# Patient Record
Sex: FEMALE | Race: WHITE | URBAN - METROPOLITAN AREA
[De-identification: names, ages, dates, MRNs, and addresses within clinical notes are randomized per-mention and may not be internally consistent; named-entity substitution may affect disease eponyms.]

---

## 2018-06-14 ENCOUNTER — OFFICE VISIT (OUTPATIENT)
Dept: PRIMARY CARE CLINIC | Age: 28
End: 2018-06-14
Payer: COMMERCIAL

## 2018-06-14 VITALS — WEIGHT: 153.7 LBS | HEIGHT: 66 IN | TEMPERATURE: 97.9 F | BODY MASS INDEX: 24.7 KG/M2

## 2018-06-14 DIAGNOSIS — J30.1 ACUTE SEASONAL ALLERGIC RHINITIS DUE TO POLLEN: ICD-10-CM

## 2018-06-14 DIAGNOSIS — Z00.00 ANNUAL PHYSICAL EXAM: Primary | ICD-10-CM

## 2018-06-14 PROCEDURE — 1036F TOBACCO NON-USER: CPT | Performed by: FAMILY MEDICINE

## 2018-06-14 PROCEDURE — 99203 OFFICE O/P NEW LOW 30 MIN: CPT | Performed by: FAMILY MEDICINE

## 2018-06-14 PROCEDURE — G8419 CALC BMI OUT NRM PARAM NOF/U: HCPCS | Performed by: FAMILY MEDICINE

## 2018-06-14 PROCEDURE — G8427 DOCREV CUR MEDS BY ELIG CLIN: HCPCS | Performed by: FAMILY MEDICINE

## 2018-06-14 ASSESSMENT — ENCOUNTER SYMPTOMS
EYES NEGATIVE: 1
EYE REDNESS: 0
SHORTNESS OF BREATH: 0
EYE ITCHING: 0
WHEEZING: 0
GASTROINTESTINAL NEGATIVE: 1
ABDOMINAL PAIN: 0
CONSTIPATION: 0
DIARRHEA: 0
RESPIRATORY NEGATIVE: 1
BACK PAIN: 0
PHOTOPHOBIA: 0

## 2018-06-14 ASSESSMENT — PATIENT HEALTH QUESTIONNAIRE - PHQ9
2. FEELING DOWN, DEPRESSED OR HOPELESS: 0
SUM OF ALL RESPONSES TO PHQ9 QUESTIONS 1 & 2: 0
SUM OF ALL RESPONSES TO PHQ QUESTIONS 1-9: 0
1. LITTLE INTEREST OR PLEASURE IN DOING THINGS: 0

## 2024-07-08 RX ORDER — ONDANSETRON 4 MG/1
TABLET, FILM COATED ORAL
Qty: 30 TABLET | Refills: 1 | Status: SHIPPED | OUTPATIENT
Start: 2024-07-08

## 2025-06-25 ENCOUNTER — OFFICE VISIT (OUTPATIENT)
Dept: PRIMARY CARE | Facility: CLINIC | Age: 35
End: 2025-06-25
Payer: COMMERCIAL

## 2025-06-25 VITALS
OXYGEN SATURATION: 99 % | WEIGHT: 137 LBS | DIASTOLIC BLOOD PRESSURE: 58 MMHG | SYSTOLIC BLOOD PRESSURE: 94 MMHG | TEMPERATURE: 98.7 F | HEIGHT: 65 IN | HEART RATE: 97 BPM | BODY MASS INDEX: 22.82 KG/M2

## 2025-06-25 DIAGNOSIS — H01.114 ALLERGIC DERMATITIS OF UPPER AND LOWER EYELID OF LEFT EYE: ICD-10-CM

## 2025-06-25 DIAGNOSIS — L29.9 ITCHY SKIN: Primary | ICD-10-CM

## 2025-06-25 DIAGNOSIS — H01.115 ALLERGIC DERMATITIS OF UPPER AND LOWER EYELID OF LEFT EYE: ICD-10-CM

## 2025-06-25 DIAGNOSIS — H02.846 SWELLING OF LEFT EYELID, UNSPECIFIED EYELID: ICD-10-CM

## 2025-06-25 PROCEDURE — 99203 OFFICE O/P NEW LOW 30 MIN: CPT | Performed by: NURSE PRACTITIONER

## 2025-06-25 RX ORDER — TRIAMCINOLONE ACETONIDE 5 MG/G
OINTMENT TOPICAL 2 TIMES DAILY
Qty: 15 G | Refills: 1 | Status: SHIPPED | OUTPATIENT
Start: 2025-06-25 | End: 2025-07-10

## 2025-06-25 RX ORDER — BISMUTH SUBSALICYLATE 262 MG
1 TABLET,CHEWABLE ORAL DAILY
COMMUNITY

## 2025-06-25 RX ORDER — PREDNISONE 10 MG/1
TABLET ORAL
Qty: 30 TABLET | Refills: 0 | Status: SHIPPED | OUTPATIENT
Start: 2025-06-25 | End: 2025-07-05

## 2025-06-25 ASSESSMENT — PATIENT HEALTH QUESTIONNAIRE - PHQ9
1. LITTLE INTEREST OR PLEASURE IN DOING THINGS: NOT AT ALL
1. LITTLE INTEREST OR PLEASURE IN DOING THINGS: NOT AT ALL
2. FEELING DOWN, DEPRESSED OR HOPELESS: NOT AT ALL
SUM OF ALL RESPONSES TO PHQ9 QUESTIONS 1 AND 2: 0
2. FEELING DOWN, DEPRESSED OR HOPELESS: NOT AT ALL
SUM OF ALL RESPONSES TO PHQ9 QUESTIONS 1 AND 2: 0

## 2025-06-25 ASSESSMENT — PAIN SCALES - GENERAL: PAINLEVEL_OUTOF10: 2

## 2025-06-25 ASSESSMENT — ENCOUNTER SYMPTOMS
LOSS OF SENSATION IN FEET: 0
DEPRESSION: 0
OCCASIONAL FEELINGS OF UNSTEADINESS: 0

## 2025-06-25 NOTE — PATIENT INSTRUCTIONS
DISCHARGE SUMMARY:   Patient was seen and examined. Diagnosis, treatment, treatment options, and possible complications of today's illness discussed and explained to patient. Patient to take medication/s associated with this visit. Patient may also take OTC analgesic/antipyretic if needed for pain/fever. Advised eye protection, hand hygiene/washing, personal hygiene, and refrain from using contact lenses, eye shadows/liners, nor mascara. Advised avoidance of known or suspected allergen. Advised hypoallergenic diet. Advised to come back if with worsening or persistent symptoms. Advised to come back if there is wheezing, change in voice quality, shortness of breath or chest pain.  Advised to come back if with worsening or persistent symptoms. Patient verbalized understanding of plan of care.    Patient to come back in 7 - 10 days if needed for worsening symptoms.

## 2025-06-25 NOTE — PROGRESS NOTES
Subjective   Patient ID: Nini Grijalva is a 34 y.o. female who is with chief complaint of swelling and itching around the left eye.     HPI   Patient is a 34 y.o. female who CONSULTED AT [facility] today. Patient is with complaints of swelling and itching around the left eye. she states that the symptoms started about 2 days ago. She woke up with itching around the left eye including upper and lower eyelids. She also noted swelling and some redness on the swollen itchy skin around the eye. She denies having any trauma/injury to the eye, use of contact lens, nor any exposure to people with same symptoms. she states there were no eye pain nor photophobia. she denies any blurring of vision, visual floaters, double vision, nor change in visual acuity. she denies fever, nausea, vomiting, headache, nor any nasal congestion nor discharge. She has mild headache and pain on the left frontal area. She also noticed today an itchy red rash on her right forearm. She states she is very allergic to poison ivy and was not sure if she had been exposed. She took benadryl last night and used OTC hydrocortisone cream.     Review of Systems  General: no weight loss, generally healthy, no fatigue  Head: (+) left sided frontal headache, no sinus pain, no vertigo, no injury  Eyes: (+) swelling and itching around the left eye, no diplopia, no tearing, no pain,   Ears: no change in hearing, no tinnitus, no bleeding, no vertigo  Mouth:  no dental difficulties, no gingival bleeding, no sore throat, no loss of sense of taste  Nose: no congestion, no  discharge, no bleeding, no obstruction, no loss of sense of smell  Neck: no stiffness, no pain, no tenderness, no masses, no bruit  Pulmonary: no dyspnea, no wheezing, no hemoptysis, no cough  Cardiovascular: no chest pain, no palpitations, no syncope, no orthopnea  Gastrointestinal: no change in appetite, no dysphagia, no abdominal pains, no diarrhea, no emesis, no  "melena  Genito Urinary: no dysuria, no urinary urgency, no nocturia, no incontinence, no change in nature of urine  Musculoskeletal: no muscle ache, no joint pain, no limitation of range of motion, no paresthesia, no numbness  Skin: (+) red itchy skin rash on the right forearm,   Constitutional: no fever, no chills, no night sweats    Objective   BP 94/58 (BP Location: Left arm, Patient Position: Sitting, BP Cuff Size: Adult)   Pulse 97   Temp 37.1 °C (98.7 °F) (Temporal)   Ht 1.651 m (5' 5\")   Wt 62.1 kg (137 lb)   LMP 06/11/2025 (Approximate)   SpO2 99%   BMI 22.80 kg/m²     Physical Exam  General: ambulatory, in no acute distress  Head: normocephalic, no lesions  Eyes: pink palpebral conjunctiva, anicteric sclerae, PERRLA, EOM's full, RIGHT EYE: no redness, no tearing, no photophobia, LEFT EYES: (+) swelling and pruritus on periorbital skin area, (+) swelling, erythema, pruritus on upper and lower eyelid, no photophobia  Ears: clear external auditory canals, no ear discharge, no bleeding from the ears, tympanic membrane intact  Nose: nasal mucosa normal, no nasal discharge, no bleeding, no obstruction  Throat: clear, no exudate, no lesions  Neck: supple, no masses, no bruits  Skin: (+) solitary maculopapular rash on the right forearm : pruritic, non tender, no discharge, no bleeding,     Assessment/Plan   Problem List Items Addressed This Visit    None  Visit Diagnoses         Codes      Itchy skin    -  Primary L29.9    Relevant Medications    predniSONE (Deltasone) 10 mg tablet    triamcinolone (Kenalog) 0.5 % ointment      Swelling of left eyelid, unspecified eyelid     H02.846    Relevant Medications    predniSONE (Deltasone) 10 mg tablet    triamcinolone (Kenalog) 0.5 % ointment      Allergic dermatitis of upper and lower eyelid of left eye     H01.114, H01.115    Relevant Medications    predniSONE (Deltasone) 10 mg tablet    triamcinolone (Kenalog) 0.5 % ointment        DISCHARGE SUMMARY:   Patient " was seen and examined. Diagnosis, treatment, treatment options, and possible complications of today's illness discussed and explained to patient. Patient to take medication/s associated with this visit. Patient may also take OTC analgesic/antipyretic if needed for pain/fever. Advised eye protection, hand hygiene/washing, personal hygiene, and refrain from using contact lenses, eye shadows/liners, nor mascara. Advised avoidance of known or suspected allergen. Advised hypoallergenic diet. Advised to come back if with worsening or persistent symptoms. Advised to come back if there is wheezing, change in voice quality, shortness of breath or chest pain.  Advised to come back if with worsening or persistent symptoms. Patient verbalized understanding of plan of care.    Patient to come back in 7 - 10 days if needed for worsening symptoms.